# Patient Record
(demographics unavailable — no encounter records)

---

## 2024-12-18 NOTE — ASSESSMENT
[FreeTextEntry1] : 60-year-old female here for evaluation of osteoporosis and thyroid nodules.  Thyroid nodules -Previously had a biopsy of a 0.8 cm nodule that was benign -Other nodules are sub centimeter in size -TFTs were within normal limits previously  Plan: -We will check repeat ultrasound in 1 year - check TSH   Osteoporosis -L-spine of -2.1, femoral neck 0.4, total hip 0.4 -Last visit in may 2023, patient was requesting to come off fosamax- and was given drug holiday - will be due for another DXA in may 2025 - continue with calcium, vitamin D  Plan: - will check labs with CMP, vitamin D, PTH level  - will check another DXA in may 2025

## 2024-12-18 NOTE — HISTORY OF PRESENT ILLNESS
[FreeTextEntry1] : 60-year-old female here for evaluation of osteoporosis and thyroid nodules.  Bone disease evaluation:  FRAX osteoporosis risk factors:  Hx of height loss? Yes, 1 inch  Current smoker?  No Previous fracture? Yes wrist fx at age 12  FHx of hip fracture in parent? Yes in her mother  Glucocorticoid use?  No Rheumatoid arthritis?  No  Calcium intake: -Diet:Green leafy vegetables  -Supplement: Calcium 650 mg and Vitamin D 2000 units   Hormone-replacement therapy: Estradiol 0.5 mg 2 times per week, progesterone 100 mg x 5 times per week, testosterone (prescribed and managed by GYN)   LMP: unknown, Hysterectomy in 2008 Bone-active drugs: On Fosamax for 2 years previoulsy (stopped May 2023 for drug holiday) Hx of kidney stones? No  #Thyroid nodules  No FH of thyroid cancer  No history of head or neck radiation exposure  No compressive symptoms in the neck  She reported that she had an FNA  that was benign of the left side in 0.8 cm   Thyroid US in May 2023:  The right thyroid lobe measures 3.85 x 1.12 x 1.45 cm. The left thyroid lobe measures 3.83 x 1.02 x 1.8 cm. The isthmus measures 0.23 cm.   The right thyroid lobe has a homogenous parenchyma.   The left thyroid lobe has a homogeneous parenchyma.     In the right upper pole there is a  solid, hypoechoic nodule. It measures 0.52 x 0.28 x 0.31 cm. The nodule is ovoid in shape and the border is smooth. The nodule does not have a halo. It demonstrates no calcification. It has peripheral vascularity.    In the right mid pole there is a  solid, hypoechoic nodule. It measures 0.5 x 0.27 x 0.32 cm. The nodule is ovoid in shape and the border is smooth. The nodule does not have a halo. It has peripheral vascularity. Doppler grade 2 on power doppler.    In the left upper pole there is a  hypoechoic nodule. It measures 0.75 x 0.34 x 0.53 cm. The nodule is ovoid in shape and the border is smooth. The nodule does not have a halo. It has peripheral vascularity. Doppler grade 2 on power doppler.    In the left mid pole there is a  hypoechoic nodule. It measures 0.65 x 0.29 x 0.63 cm. The nodule is ovoid in shape and the border is smooth. The nodule does not have a halo. It has peripheral vascularity. Doppler grade 2 on power doppler.